# Patient Record
Sex: FEMALE | Race: OTHER | NOT HISPANIC OR LATINO | URBAN - METROPOLITAN AREA
[De-identification: names, ages, dates, MRNs, and addresses within clinical notes are randomized per-mention and may not be internally consistent; named-entity substitution may affect disease eponyms.]

---
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
## 2024-01-02 LAB
ABO + RH PNL BLD: NORMAL
BASOPHILS # BLD AUTO: 0.05 K/UL
BASOPHILS NFR BLD AUTO: 0.6 %
BLD GP AB SCN SERPL QL: NORMAL
C TRACH RRNA SPEC QL NAA+PROBE: NOT DETECTED
EOSINOPHIL # BLD AUTO: 0.08 K/UL
EOSINOPHIL NFR BLD AUTO: 0.9 %
HCG SERPL-MCNC: 7085 MIU/ML
HCT VFR BLD CALC: 37.3 %
HGB BLD-MCNC: 12.4 G/DL
IMM GRANULOCYTES NFR BLD AUTO: 0.3 %
LYMPHOCYTES # BLD AUTO: 2.3 K/UL
LYMPHOCYTES NFR BLD AUTO: 26.8 %
MAN DIFF?: NORMAL
MCHC RBC-ENTMCNC: 31.6 PG
MCHC RBC-ENTMCNC: 33.2 GM/DL
MCV RBC AUTO: 94.9 FL
MONOCYTES # BLD AUTO: 0.74 K/UL
MONOCYTES NFR BLD AUTO: 8.6 %
N GONORRHOEA RRNA SPEC QL NAA+PROBE: NOT DETECTED
NEUTROPHILS # BLD AUTO: 5.39 K/UL
NEUTROPHILS NFR BLD AUTO: 62.8 %
PLATELET # BLD AUTO: 253 K/UL
RBC # BLD: 3.93 M/UL
RBC # FLD: 13.5 %
SOURCE AMPLIFICATION: NORMAL
WBC # FLD AUTO: 8.59 K/UL

## 2024-01-04 ENCOUNTER — ASOB RESULT (OUTPATIENT)
Age: 19
End: 2024-01-04

## 2024-01-04 ENCOUNTER — APPOINTMENT (OUTPATIENT)
Dept: OBGYN | Facility: CLINIC | Age: 19
End: 2024-01-04
Payer: COMMERCIAL

## 2024-01-04 VITALS
DIASTOLIC BLOOD PRESSURE: 82 MMHG | WEIGHT: 129 LBS | SYSTOLIC BLOOD PRESSURE: 125 MMHG | BODY MASS INDEX: 23.74 KG/M2 | HEIGHT: 62 IN | HEART RATE: 86 BPM

## 2024-01-04 DIAGNOSIS — O30.041 TWIN PREGNANCY, DICHORIONIC/DIAMNIOTIC, FIRST TRIMESTER: ICD-10-CM

## 2024-01-04 PROCEDURE — 76817 TRANSVAGINAL US OBSTETRIC: CPT

## 2024-01-04 PROCEDURE — 99214 OFFICE O/P EST MOD 30 MIN: CPT

## 2024-01-04 NOTE — HISTORY OF PRESENT ILLNESS
[FreeTextEntry1] : 19 y/o presents for follow up confirmation of pregnancy. TVUS shows live di-di TIUP. Patient initially considering termination of pregnancy, now states that she is unsure.

## 2024-01-04 NOTE — PLAN
[FreeTextEntry1] : 19 y/o with di-di TIUP, unsure if desired, considering options  Plan: - Patient would like time to consider her options - discussed termination via medical or surgical; discussed with patient medical is done before 10 weeks gestation - patient to see Dr. MUNOZ in office in 2 weeks for follow up, will decide at that time

## 2024-01-04 NOTE — PHYSICAL EXAM
[Chaperone Present] : A chaperone was present in the examining room during all aspects of the physical examination [Appropriately responsive] : appropriately responsive [Alert] : alert [No Acute Distress] : no acute distress [Regular Rate Rhythm] : regular rate rhythm [Oriented x3] : oriented x3

## 2024-01-10 LAB — HCG SERPL-MCNC: ABNORMAL MIU/ML

## 2024-01-14 NOTE — SIGNATURES
[TextEntry] : This note was written by Cathryn Griggs on 12/29/2023 actively solely VON Rivers M.D 12/29/2023. All medical record entries made by this scribe were at my  VON Rivers M.D  direction and personally dictated by me on 12/29/2023. I have personally reviewed my chart and agree that the record reflects my personal performance of the history, physical exam, assessment, and plan.

## 2024-01-14 NOTE — HISTORY OF PRESENT ILLNESS
[FreeTextEntry1] : 18 year old P0 female presents as new patient for missed menses. Ho Chlamydia. Pt was using Nuvaring and removed it yesterday. Pt had a positive at home pregnancy test 3 days ago. Today's TVS revealed irregular shaped gestational sac. Pt advised about sono results, and repeating HCG levels. Additionally, pt reports right lower quadrant pain and states having a diagnostic laparoscopy for endometriosis last March.

## 2024-01-14 NOTE — PLAN
[FreeTextEntry1] : UCG +. likely 5wks by LMP.  Discussed possibility of early pregnancy vs ectopic vs failed pregnancy. Ectopic precautions given. The patient had an opportunity to have all of her questions answered to her apparent satisfaction.   HCG drawn today GC testing done today CBC drawn today

## 2024-01-16 ENCOUNTER — APPOINTMENT (OUTPATIENT)
Dept: OBGYN | Facility: CLINIC | Age: 19
End: 2024-01-16
Payer: COMMERCIAL

## 2024-01-16 ENCOUNTER — APPOINTMENT (OUTPATIENT)
Dept: OBGYN | Facility: CLINIC | Age: 19
End: 2024-01-16

## 2024-01-16 VITALS
HEART RATE: 90 BPM | BODY MASS INDEX: 24.29 KG/M2 | WEIGHT: 132 LBS | SYSTOLIC BLOOD PRESSURE: 125 MMHG | DIASTOLIC BLOOD PRESSURE: 78 MMHG | HEIGHT: 62 IN

## 2024-01-16 DIAGNOSIS — Z64.0 PROBLEMS RELATED TO UNWANTED PREGNANCY: ICD-10-CM

## 2024-01-16 PROCEDURE — S0199: CPT

## 2024-01-16 PROCEDURE — 99214 OFFICE O/P EST MOD 30 MIN: CPT

## 2024-01-16 PROCEDURE — S0190: CPT

## 2024-01-16 NOTE — PLAN
[FreeTextEntry1] : 17y/o  at 7w4d with di/di TIUP presents to discuss pregnancy options. Patient desires medical termination of pregnancy   #Medical  - All consents signed today, all questions/concerns addressed - mifepristone consents signed, mifepristone pamphlet given - Mifepristone 200 mg administered, lot #: [id number]  #ID/Neuro - GC/CT neg - pt desires HIV/RPR/hepatitis testing - Prescription for 5 oxycodone 5mg with no refills given - Reviewed ibuprofen 600 mg q6 and acetaminophen 975mg q6 for pain mamangement -Behavioral health resources provided  #Labs/Blood type - CBC: 12.4/37.3 - Patient is Rh + will not need Rhogam the day of the mifepristone  #Contraception - Patient counseled on all contraceptive options - Patient desires to continue conversation at followup appointment   #Post op - Post-operative follow-up visit to be scheduled in 2 weeks - Pt given 24 hour contact information - Post-operative instruction sheet given, reviewed bleeding and infection precautions  RTO 2 weeks  gracia RIOS

## 2024-01-16 NOTE — HISTORY OF PRESENT ILLNESS
[FreeTextEntry1] : 19y/o  presents for followup. Pt is pregnant with di/di TIUP at 7w4d  Pt is unsure about continuation of pregnancy given unstable living situation and income. After extensive discussion about termination vs continuation of pregnancy, pt desires medical termination of pregnancy.

## 2024-01-17 RX ORDER — MISOPROSTOL 200 UG/1
200 TABLET ORAL
Qty: 4 | Refills: 0 | Status: ACTIVE | COMMUNITY
Start: 2024-01-17 | End: 1900-01-01

## 2024-01-17 RX ORDER — OXYCODONE 5 MG/1
5 TABLET ORAL
Qty: 5 | Refills: 0 | Status: ACTIVE | COMMUNITY
Start: 2024-01-17 | End: 1900-01-01

## 2024-01-30 ENCOUNTER — APPOINTMENT (OUTPATIENT)
Dept: OBGYN | Facility: CLINIC | Age: 19
End: 2024-01-30

## 2024-12-16 NOTE — ASU PATIENT PROFILE, ADULT - PRO MENTAL HEALTH SX RECENT
none
- Continue current diet order, which remains appropriate at this time. Defer food and fluid consistency to SLP/Team.   - Recommend Glucerna 1x daily (provides 220 kcal, 10 gm protein per 8oz serving).   - Continue micronutrient supplementation as medically appropriate.  - Encourage and assist Pt with meals, Monitor PO diet tolerance.   - Monitor weights, diet tolerance, skin integrity, BMs, pertinent labs.   - RDN services remain available as needed.

## (undated) DEVICE — PACK PERI GYN

## (undated) DEVICE — SYR LUER LOK 10CC

## (undated) DEVICE — ELCTR GROUNDING PAD ADULT COVIDIEN

## (undated) DEVICE — TROCAR COVIDIEN VERSAONE BLADED SMOOTH 11MM STANDARD

## (undated) DEVICE — DRAPE LIGHT HANDLE COVER (BLUE)

## (undated) DEVICE — D HELP - CLEARVIEW CLEARIFY SYSTEM

## (undated) DEVICE — SUT POLYSORB 0 30" GU-46

## (undated) DEVICE — TROCAR COVIDIEN VERSAONE BLADED FIXATION 5MM STANDARD

## (undated) DEVICE — FOLEY TRAY 16FR SURESTEP LTX BG

## (undated) DEVICE — SOL IRR POUR NS 0.9% 1500ML

## (undated) DEVICE — PACK GENERAL LAPAROSCOPY

## (undated) DEVICE — UTERINE MANIPULATOR COOPER SURGICAL 5MM 33CM GREEN

## (undated) DEVICE — INSUFFLATION NDL COVIDIEN SURGINEEDLE VERESS 120MM

## (undated) DEVICE — WARMING BLANKET UPPER ADULT

## (undated) DEVICE — TROCAR COVIDIEN VERSAPORT BLADELESS OPTICAL 5MM STANDARD

## (undated) DEVICE — VENODYNE/SCD SLEEVE CALF MEDIUM